# Patient Record
Sex: MALE | Race: WHITE
[De-identification: names, ages, dates, MRNs, and addresses within clinical notes are randomized per-mention and may not be internally consistent; named-entity substitution may affect disease eponyms.]

---

## 2018-07-10 ENCOUNTER — HOSPITAL ENCOUNTER (EMERGENCY)
Dept: HOSPITAL 57 - BURERS | Age: 83
Discharge: HOME | End: 2018-07-10
Payer: OTHER GOVERNMENT

## 2018-07-10 DIAGNOSIS — N40.0: ICD-10-CM

## 2018-07-10 DIAGNOSIS — E11.9: ICD-10-CM

## 2018-07-10 DIAGNOSIS — Z79.899: ICD-10-CM

## 2018-07-10 DIAGNOSIS — Z86.73: ICD-10-CM

## 2018-07-10 DIAGNOSIS — Z87.891: ICD-10-CM

## 2018-07-10 DIAGNOSIS — I10: ICD-10-CM

## 2018-07-10 DIAGNOSIS — R04.0: Primary | ICD-10-CM

## 2018-07-10 DIAGNOSIS — E78.5: ICD-10-CM

## 2018-07-10 PROCEDURE — 30901 CONTROL OF NOSEBLEED: CPT

## 2018-07-13 ENCOUNTER — HOSPITAL ENCOUNTER (EMERGENCY)
Dept: HOSPITAL 57 - BURERS | Age: 83
Discharge: HOME | End: 2018-07-13
Payer: OTHER GOVERNMENT

## 2018-07-13 DIAGNOSIS — Z79.899: ICD-10-CM

## 2018-07-13 DIAGNOSIS — N40.0: ICD-10-CM

## 2018-07-13 DIAGNOSIS — Z87.891: ICD-10-CM

## 2018-07-13 DIAGNOSIS — E78.5: ICD-10-CM

## 2018-07-13 DIAGNOSIS — I10: ICD-10-CM

## 2018-07-13 DIAGNOSIS — E11.9: ICD-10-CM

## 2018-07-13 DIAGNOSIS — Z48.00: Primary | ICD-10-CM

## 2018-07-13 PROCEDURE — 99282 EMERGENCY DEPT VISIT SF MDM: CPT

## 2018-10-11 ENCOUNTER — HOSPITAL ENCOUNTER (EMERGENCY)
Dept: HOSPITAL 57 - BURERS | Age: 83
End: 2018-10-11
Payer: OTHER GOVERNMENT

## 2018-10-11 DIAGNOSIS — R20.2: Primary | ICD-10-CM

## 2018-10-11 DIAGNOSIS — R29.818: ICD-10-CM

## 2018-10-11 DIAGNOSIS — R27.0: ICD-10-CM

## 2018-10-11 DIAGNOSIS — Z79.84: ICD-10-CM

## 2018-10-11 DIAGNOSIS — R47.9: ICD-10-CM

## 2018-10-11 DIAGNOSIS — R44.9: ICD-10-CM

## 2018-10-11 DIAGNOSIS — E11.9: ICD-10-CM

## 2018-10-11 DIAGNOSIS — E78.5: ICD-10-CM

## 2018-10-11 DIAGNOSIS — I10: ICD-10-CM

## 2018-10-11 DIAGNOSIS — N40.0: ICD-10-CM

## 2018-10-11 DIAGNOSIS — Z79.899: ICD-10-CM

## 2018-10-11 DIAGNOSIS — Z86.73: ICD-10-CM

## 2018-10-11 DIAGNOSIS — Z87.891: ICD-10-CM

## 2018-10-11 LAB
ALBUMIN SERPL BCG-MCNC: 4.3 G/DL (ref 3.4–4.8)
ALP SERPL-CCNC: 92 U/L (ref 40–150)
ALT SERPL W P-5'-P-CCNC: 14 U/L (ref 8–55)
ANION GAP SERPL CALC-SCNC: 15 MMOL/L (ref 10–20)
AST SERPL-CCNC: 16 U/L (ref 5–34)
BASOPHILS # BLD AUTO: 0.1 THOU/UL (ref 0–0.2)
BASOPHILS NFR BLD AUTO: 1.1 % (ref 0–1)
BILIRUB SERPL-MCNC: 0.5 MG/DL (ref 0.2–1.2)
BUN SERPL-MCNC: 19 MG/DL (ref 8.4–25.7)
CALCIUM SERPL-MCNC: 9.4 MG/DL (ref 7.8–10.44)
CHLORIDE SERPL-SCNC: 102 MMOL/L (ref 98–107)
CK MB SERPL-MCNC: 3 NG/ML (ref 0–6.6)
CO2 SERPL-SCNC: 24 MMOL/L (ref 23–31)
CREAT CL PREDICTED SERPL C-G-VRATE: 0 ML/MIN (ref 70–130)
EOSINOPHIL # BLD AUTO: 0.2 THOU/UL (ref 0–0.7)
EOSINOPHIL NFR BLD AUTO: 3.3 % (ref 0–10)
GLOBULIN SER CALC-MCNC: 3.2 G/DL (ref 2.4–3.5)
GLUCOSE SERPL-MCNC: 274 MG/DL (ref 83–110)
HGB BLD-MCNC: 14.5 G/DL (ref 14–18)
LYMPHOCYTES # BLD AUTO: 2 THOU/UL (ref 1.2–3.4)
LYMPHOCYTES NFR BLD AUTO: 27.7 % (ref 21–51)
MCH RBC QN AUTO: 30 PG (ref 27–31)
MCV RBC AUTO: 85.2 FL (ref 78–98)
MONOCYTES # BLD AUTO: 0.6 THOU/UL (ref 0.11–0.59)
MONOCYTES NFR BLD AUTO: 7.8 % (ref 0–10)
NEUTROPHILS # BLD AUTO: 4.3 THOU/UL (ref 1.4–6.5)
NEUTROPHILS NFR BLD AUTO: 60.1 % (ref 42–75)
PLATELET # BLD AUTO: 337 THOU/UL (ref 130–400)
POTASSIUM SERPL-SCNC: 4.5 MMOL/L (ref 3.5–5.1)
RBC # BLD AUTO: 4.84 MILL/UL (ref 4.7–6.1)
SODIUM SERPL-SCNC: 136 MMOL/L (ref 136–145)
TROPONIN I SERPL DL<=0.01 NG/ML-MCNC: (no result) NG/ML (ref ?–0.03)
WBC # BLD AUTO: 7.2 THOU/UL (ref 4.8–10.8)

## 2018-10-11 PROCEDURE — 80053 COMPREHEN METABOLIC PANEL: CPT

## 2018-10-11 PROCEDURE — 82553 CREATINE MB FRACTION: CPT

## 2018-10-11 PROCEDURE — 36416 COLLJ CAPILLARY BLOOD SPEC: CPT

## 2018-10-11 PROCEDURE — 85025 COMPLETE CBC W/AUTO DIFF WBC: CPT

## 2018-10-11 PROCEDURE — 93005 ELECTROCARDIOGRAM TRACING: CPT

## 2018-10-11 PROCEDURE — 84484 ASSAY OF TROPONIN QUANT: CPT

## 2018-10-11 PROCEDURE — 70450 CT HEAD/BRAIN W/O DYE: CPT

## 2018-10-11 NOTE — CT
CT OF THE BRAIN WITHOUT CONTRAST

10/11/18

 

Comparison is made with the 2/ 22/15 study.

 

Atrophy and mild compensatory dilatation of the ventricles is the same as before. Some lacunar infarc
ts are seen around the left basal ganglia region and have not changed since the prior scan. There wer
e no findings of acute stroke, mass or edema. There is no ventricular shift. The calvarium appears in
tact and the visible paranasal sinuses are clear. The nasal septum is deviated towards the left side.


 

IMPRESSION:  

Chronic ischemic changes but no acute findings. 

 

POS: HOME

## 2019-09-24 ENCOUNTER — HOSPITAL ENCOUNTER (EMERGENCY)
Dept: HOSPITAL 92 - ERS | Age: 84
Discharge: ANOTHER HOSPITAL | End: 2019-09-24
Payer: SELF-PAY

## 2019-09-24 DIAGNOSIS — A09: ICD-10-CM

## 2019-09-24 DIAGNOSIS — Z86.73: ICD-10-CM

## 2019-09-24 DIAGNOSIS — Z87.891: ICD-10-CM

## 2019-09-24 DIAGNOSIS — A41.9: Primary | ICD-10-CM

## 2019-09-24 DIAGNOSIS — I10: ICD-10-CM

## 2019-09-24 DIAGNOSIS — Z79.899: ICD-10-CM

## 2019-09-24 DIAGNOSIS — E78.5: ICD-10-CM

## 2019-09-24 DIAGNOSIS — R65.21: ICD-10-CM

## 2019-09-24 DIAGNOSIS — E11.9: ICD-10-CM

## 2019-09-24 LAB
ALBUMIN SERPL BCG-MCNC: 3.9 G/DL (ref 3.4–4.8)
ALP SERPL-CCNC: 85 U/L (ref 40–150)
ALT SERPL W P-5'-P-CCNC: 11 U/L (ref 8–55)
ANION GAP SERPL CALC-SCNC: 19 MMOL/L (ref 10–20)
AST SERPL-CCNC: 20 U/L (ref 5–34)
BASOPHILS # BLD AUTO: 0 THOU/UL (ref 0–0.2)
BASOPHILS NFR BLD AUTO: 0.4 % (ref 0–1)
BILIRUB SERPL-MCNC: 0.7 MG/DL (ref 0.2–1.2)
BUN SERPL-MCNC: 27 MG/DL (ref 8.4–25.7)
CALCIUM SERPL-MCNC: 8.3 MG/DL (ref 7.8–10.44)
CHLORIDE SERPL-SCNC: 105 MMOL/L (ref 98–107)
CO2 SERPL-SCNC: 15 MMOL/L (ref 23–31)
CREAT CL PREDICTED SERPL C-G-VRATE: 0 ML/MIN (ref 70–130)
EOSINOPHIL # BLD AUTO: 0.1 THOU/UL (ref 0–0.7)
EOSINOPHIL NFR BLD AUTO: 0.6 % (ref 0–10)
GLOBULIN SER CALC-MCNC: 3 G/DL (ref 2.4–3.5)
GLUCOSE SERPL-MCNC: 351 MG/DL (ref 83–110)
GLUCOSE UR STRIP-MCNC: 200 MG/DL
HGB BLD-MCNC: 15 G/DL (ref 14–18)
LYMPHOCYTES # BLD: 2.2 THOU/UL (ref 1.2–3.4)
LYMPHOCYTES NFR BLD AUTO: 16.5 % (ref 21–51)
MCH RBC QN AUTO: 29.7 PG (ref 27–31)
MCV RBC AUTO: 87 FL (ref 78–98)
MONOCYTES # BLD AUTO: 0.3 THOU/UL (ref 0.11–0.59)
MONOCYTES NFR BLD AUTO: 2.3 % (ref 0–10)
NEUTROPHILS # BLD AUTO: 10.7 THOU/UL (ref 1.4–6.5)
NEUTROPHILS NFR BLD AUTO: 80.3 % (ref 42–75)
PLATELET # BLD AUTO: 369 THOU/UL (ref 130–400)
POTASSIUM SERPL-SCNC: 3.9 MMOL/L (ref 3.5–5.1)
PROT UR STRIP.AUTO-MCNC: 20 MG/DL
RBC # BLD AUTO: 5.06 MILL/UL (ref 4.7–6.1)
SODIUM SERPL-SCNC: 135 MMOL/L (ref 136–145)
WBC # BLD AUTO: 13.3 THOU/UL (ref 4.8–10.8)

## 2019-09-24 PROCEDURE — 87040 BLOOD CULTURE FOR BACTERIA: CPT

## 2019-09-24 PROCEDURE — 96365 THER/PROPH/DIAG IV INF INIT: CPT

## 2019-09-24 PROCEDURE — 36415 COLL VENOUS BLD VENIPUNCTURE: CPT

## 2019-09-24 PROCEDURE — 87449 NOS EACH ORGANISM AG IA: CPT

## 2019-09-24 PROCEDURE — 96367 TX/PROPH/DG ADDL SEQ IV INF: CPT

## 2019-09-24 PROCEDURE — 83605 ASSAY OF LACTIC ACID: CPT

## 2019-09-24 PROCEDURE — 80053 COMPREHEN METABOLIC PANEL: CPT

## 2019-09-24 PROCEDURE — 87086 URINE CULTURE/COLONY COUNT: CPT

## 2019-09-24 PROCEDURE — 74177 CT ABD & PELVIS W/CONTRAST: CPT

## 2019-09-24 PROCEDURE — 51701 INSERT BLADDER CATHETER: CPT

## 2019-09-24 PROCEDURE — 36416 COLLJ CAPILLARY BLOOD SPEC: CPT

## 2019-09-24 PROCEDURE — 81003 URINALYSIS AUTO W/O SCOPE: CPT

## 2019-09-24 PROCEDURE — 96361 HYDRATE IV INFUSION ADD-ON: CPT

## 2019-09-24 PROCEDURE — 71045 X-RAY EXAM CHEST 1 VIEW: CPT

## 2019-09-24 PROCEDURE — 87324 CLOSTRIDIUM AG IA: CPT

## 2019-09-24 PROCEDURE — 85025 COMPLETE CBC W/AUTO DIFF WBC: CPT

## 2019-09-24 PROCEDURE — 70450 CT HEAD/BRAIN W/O DYE: CPT

## 2019-09-24 NOTE — CT
CT Brain WO Con



History: Altered mental status



Comparison: CT brain October 2018



Findings: No acute hemorrhage or infarct. Moderate atrophy. Old lacunar infarcts.



No midline shift. No mass effect.



The paranasal sinuses and mastoids are clear.



Globes are intact.



Impression: Chronic findings. No acute intracranial abnormality.



Reported By: Conner Jenkins 

Electronically Signed:  9/24/2019 3:31 PM

## 2019-09-24 NOTE — CT
CT Abdomen Pelvis W Con



History: Abdominal pain. Nausea and vomiting



Comparison: None.



Findings: Patchy opacities in both lower lobes. Early interstitial fibrosis in the lower lobes.



Large sliding hiatal hernia. There is a diverticulum near the gastric fundus. Enlarged perigastric ly
mph nodes which are herniated in the thoracic cavity.



Liver is unremarkable as well as the spleen. Pancreas has mild fatty atrophy throughout its parenchym
a. No intrahepatic or extra hepatic biliary dilatation.



The rectum and rectosigmoid junction is markedly thickened with large volume gas and stool there is a
lso stranding in the sigmoid mesentery. This suggests high-grade colitis and proctitis. Mild edema

within the mesorectal fat. Unformed stool throughout the colon.



Inferior mesenteric artery appears be patent at least proximally.



No free intraperitoneal gas. Moderate distention of the urinary bladder. No retroperitoneal periaorti
c adenopathy.



Hypodensity anterior cortex interpolar left kidney with fluid characteristics of a cyst. Same is true
 for left inferior renal hypodensity.



No aneurysmal dilatation of the aorta. Bilateral fat-containing direct inguinal hernias.



Impression: 

1. Abnormal dilatation of the rectum and rectosigmoid junction with surrounding edema suggesting coli
tis. There is unformed stool throughout the colon also corroborating this diagnosis. Given the

large dilatation of the gas and stool filled rectum, disimpaction may be beneficial.

2. Scattered opacities within both lower lobes with large sliding hiatal hernia likely aspiration pne
umonia.



Reported By: Conner Jenkins 

Electronically Signed:  9/24/2019 3:36 PM

## 2019-09-24 NOTE — HP
PRIMARY [facility]:  [facility]



REASON FOR ADMISSION:  Sepsis, gastroenteritis, moderate-to-severe dehydration,

acute metabolic acidosis, acute kidney injury, diabetes mellitus type 2

uncontrolled. 



HISTORY OF PRESENTING ILLNESS:  Please note majority of this history is 
obtained by

talking to the ER physician and the patient's sister and brother at bedside as 
the

patient is not oriented at present.  He apparently went to [employer] and had a

hamburger and drank strawberry yogurt yesterday.  Around midnight, the patient

started to have profuse diarrhea.  He started to vomit this morning and felt 
very

nauseous.  His caregiver went to check on him this morning.  He lives alone in

[place].  The caregiver texted the patient's sister saying he did not look 
good and

had diarrhea.  After an hour or 2, the patient started to have more episodes of

vomiting, retching, and slumped in his chair, he could not get up or walk.  
Then the

caregiver called EMS and the patient was brought to emergency room.  He normally

ambulates by himself and does all his activities of daily living at home.  [facility]

[facility] here [facility] [place].  He had a temperature of 99

degrees in the ER.  No complaints of chest pain, palpitation, PND, or orthopnea.

The patient is currently slowly waking up and is able to recognize his son, and 
says

that he has two children, both boy and a girl which are accurate.  He trails 
off and

soon falls asleep. 



PAST MEDICAL AND SURGICAL HISTORY:  Hypertension, benign prostatic hypertrophy,

diabetes mellitus type 2, dyslipidemia, appendectomy, hernia repair, history of 
CVA

with no residual deficits. 



CURRENT MEDICATIONS:  The patient is on;

1. Flomax 0.4 mg p.o. at bedtime.

2. Finasteride 1 mg p.o. q.a.m.

3. Aspirin 81 mg p.o. daily.

4. Glipizide 5 mg p.o. twice daily.

5. Amlodipine 2.5 mg p.o. daily. 

The daughter at bedside is [facility] get [facility] his current med list to the ER. 



PERSONAL HISTORY:  Quit smoking more than 10 years ago.  Does not abuse alcohol 
or

drugs.  He lives alone in [place]. 



FAMILY HISTORY:  Mother  in a nursing home.  She had osteoarthritis.  She 
was in

her late 70s.  Father  of lung cancer at the age of 72 years, exposed to

asbestos and was a heavy smoker as well. 



CODE STATUS:  Full.  Power of  is his daughter, Ms. Carolyn Hugo, 
number to

reach her is [phone]. 



REVIEW OF SYSTEMS:  Cannot be obtained as the patient is very lethargic and is 
not

oriented. 



PHYSICAL EXAMINATION:

GENERAL:  The patient is an 89-year-old male, who is currently very lethargic 
and is

not oriented. 

VITAL SIGNS:  Blood pressure 146/80, pulse 120 per minute, respiratory rate 18 
per

minute, temperature 97.8 degrees Fahrenheit, saturating 93% on room air. 

NECK:  Supple.  No elevated JVD. 

HEENT:  Eyes; extraocular muscles intact.  Pupils reacting to light.  Oral 
cavity,

mucous membranes are dry.  No exudates or congestion. 

CARDIOVASCULAR:  S1 and S2 heard.  Tachycardic. 

RESPIRATORY:  Air entry 1+ bilateral.  Scattered rhonchi plus.  No rales or 
wheezes. 

ABDOMEN:  Soft, bowel sounds heard.  Mild tenderness in the left lower quadrant

area.  No rigidity or guarding. 

EXTREMITIES:  Mild peripheral edema.  No calf tenderness. 

VASCULAR:  Peripheral pulses 1+ bilateral.  No ischemic ulcerations or 
gangrene. 

CENTRAL NERVOUS SYSTEM:  No gross focal deficits noted.  The patient is very

lethargic.  Responds minimally to questions and there is no focal sign seen. 

PSYCHIATRIC:  Cannot be assessed as the patient is not oriented at present.



LABORATORY DATA:  CT brain without contrast shows no acute intracranial 
abnormality.

 Chest x-ray shows increased infrahilar markings which are nonspecific.  CT 
abdomen

and pelvis with contrast done shows abnormal dilatation of rectum and 
rectosigmoid

area with surrounding edema suggesting colitis.  There was large dilatation of 
gas

and stool-filled rectum, disimpaction may be beneficial.  Also findings of large

sliding hiatal hernia were noted.  UA does not show any evidence of infection.

Serum bicarb 15, BUN 27, creatinine 1.6, serum glucose 351, lactic acid 5.1.  
Liver

enzymes within normal limits.  Albumin is 3.9.  White count of 13, hemoglobin 
and

hematocrit of 15 and 44, platelet count 369 with 80% neutrophils, MCV is 87.  
EKG

done shows sinus tach at 127 beats per minute.  There is poor R-wave 
progression and

nonspecific ST-T wave changes. 



CLINICAL IMPRESSION AND PLAN:  The patient will be admitted to IMCU for sepsis,

moderate-to-severe dehydration, acute metabolic encephalopathy, acute kidney 
injury,

metabolic acidosis, possible gastroenteritis versus food poisoning with all 
symptoms

starting yesterday evening.  Blood and urine cultures have been obtained in the 
ER.

We will obtain stool cultures.  He will be on meropenem 1 g q.8 hourly.  The 
patient

has received a total of 2 L of IV fluid in the ER.  We will continue him on 
normal

saline at 100 mL/h.  We will continue aspirin, Flomax, Proscar as before.  We 
will

keep him on clear liquid diet and hopefully after fluid resuscitation, the 
patient

will wake up more.  He will be closely monitored in IMCU. 



Addendum: [facility].

Please note this is a same day admit and discharge summary. The above was 
arranged

by  ER physician and the transport was done around 7.45pm per ER 
charge Nurse.







Job ID:  [id number]



MTDD

## 2019-09-24 NOTE — RAD
EXAM:

CHEST ONE VIEW:

9/24/19

 

HISTORY: 

Hypoxia, nausea, vomiting, diarrhea, wheezing. 

 

COMPARISON: 

None.

 

FINDINGS: 

Increased bronchovascular markings noted bilaterally particularly in the infrahilar regions. Old gran
ulomatous disease. I cannot exclude the possibility of some minimal bibasilar acute parenchymal goldstein
es including atypical pneumonia or pneumonitis and/or some subsegmental atelectasis. Upper lung zones
 appear clear. Heart size is within normal limits. 

 

IMPRESSION: 

1.      Increased markings in the infrahilar regions bilaterally, nonspecific as above. 

2.      Old granulomatous disease. 

3.      Atherosclerosis o the aorta. 

4.      No old studies for comparison. 

 

If there is concern for acute pneumonitis, treatment and short term follow-up for clearing or stabili
ty is suggested. 

 

POS: OFF